# Patient Record
Sex: MALE | Employment: FULL TIME | URBAN - METROPOLITAN AREA
[De-identification: names, ages, dates, MRNs, and addresses within clinical notes are randomized per-mention and may not be internally consistent; named-entity substitution may affect disease eponyms.]

---

## 2024-11-26 ENCOUNTER — OFFICE VISIT (OUTPATIENT)
Dept: UROLOGY | Facility: HOSPITAL | Age: 54
End: 2024-11-26
Payer: COMMERCIAL

## 2024-11-26 DIAGNOSIS — N48.6 PEYRONIE'S DISEASE: Primary | ICD-10-CM

## 2024-11-26 PROCEDURE — 1036F TOBACCO NON-USER: CPT | Performed by: UROLOGY

## 2024-11-26 PROCEDURE — 99213 OFFICE O/P EST LOW 20 MIN: CPT | Performed by: UROLOGY

## 2024-11-26 PROCEDURE — 99203 OFFICE O/P NEW LOW 30 MIN: CPT | Performed by: UROLOGY

## 2024-11-26 RX ORDER — SILDENAFIL 100 MG/1
1 TABLET, FILM COATED ORAL
COMMUNITY
Start: 2024-09-16

## 2024-11-26 RX ORDER — PRAVASTATIN SODIUM 10 MG/1
10 TABLET ORAL
COMMUNITY

## 2024-11-26 ASSESSMENT — PATIENT HEALTH QUESTIONNAIRE - PHQ9
1. LITTLE INTEREST OR PLEASURE IN DOING THINGS: NOT AT ALL
SUM OF ALL RESPONSES TO PHQ9 QUESTIONS 1 AND 2: 0
2. FEELING DOWN, DEPRESSED OR HOPELESS: NOT AT ALL

## 2024-11-26 NOTE — PROGRESS NOTES
NAME:Yogesh Luis  DATE: 11/26/2024               Subjective:   Chief complaint: peyronie's disease     HPI:  54 y.o. male presenting for evaluation of peyronie's disease  at the request of urologist in [facility] HPI:     Erectile Dysfunction:  Duration - 2 Years- pain started a year ago, just started to notice curvature  Able to obtain - yes   Able to maintain - No- sometimes   Pain - Yes  Curvature - Yes Hourglass- reversed   Libido - Good    Prior treatments - Sildenafil-not effective     Relationship status -   Sexual Partners - Female     Voiding Symptoms  Frequency: Q 3 hours   Urgency: No  Urge Incontinence: No  Nocturia: 1 times per night Sleep Disorder: No    Stream: strong  Hesitancy: No  Straining: No  Intermittency: No  Sensation of Incomplete Emptying: No    Stress Incontinence: No  Pads:  No 0per day     Dysuria:  No  Gross Hematuria:  No  UTI:  No  Stones:  No    Consumes 1 gallon of fluid per day.     No medications for his bladder in the past.    Nourodynamic testing in the past.     Bowel Function:   Pt has no  constipation. BM's Q 1 days.     No past medical history on file.  No past surgical history on file.  Social History     Tobacco Use    Smoking status: Never    Smokeless tobacco: Never   Substance Use Topics    Alcohol use: Never     No family history on file.  [unfilled]  No current outpatient medications on file prior to visit.     No current facility-administered medications on file prior to visit.     Yogesh has No Known Allergies.     Review of Systems    14 point ROS reviewed and discussed with the patient. Pertinent positives/negatives discussed in the History of Present Illness (HPI).      FH:  Prostate CA: No  Bladder CA: No  Kidney CA: No  Stones: No    Social History  Occupation: teacher   Tob: No  Etoh: No      Objective:     There is no height or weight on file to calculate BMI.   There were no vitals taken for this visit.     Physical Exam   1. Constitutional: NAD,  Well-developed, Well-nourished  2. Respiratory: Unlabored breathing, no audible wheezes, no use of accessory muscles   3. Cardiovascular: No JVD, extremities perfused, no edema  4. Abdomen: Soft, non-tender, non-distended, no masses or hernia.  No CVA tenderness.    5. Skin: no visible lesions, plaque, rashes, jaundice  6. Neuro: Gait normal, no focal neurologic deficit  7. Psychiatric: Mood and affect normal and appropriate, alert and oriented  8. :    Phallus: dense dorsal plaque   Meatus: Orthotopic and patent.   Testes:  Descended bilaterally, symmetric, without tenderness or mass.   Epididymis is normal bilaterally.  No hydrocele.  No varicocele.   Scrotum: No lesions.   Inguinal canal: No hernia or tenderness.      Assessment/Plan:   Yogesh Luis presents with     1. Peyronie's disease      Peyronie's Disease    The patient presents with Peyronie's disease.  I educated the patient about the disease, and discussed conservative versus surgical management of the disease.  Furthermore, I clarified that the disease usually presents in two phases. The first is an initial active phase characterized by inflammation and possible associated pain, and a second quiescent phase. I explained that for a patient to be a surgical candidate he must have stable and mature disease.  We discussed use of in-office intracavernosal injection (ICI) with or without duplex Doppler ultrasound as further diagnostic testing.    Pt currently able to have intercourse.  Concerned about loss of length    Discussed surgical options    Wants to hold off for now, will fu with any issues or concerns

## 2024-11-26 NOTE — PROGRESS NOTES
"NAME:Yogesh Luis  DATE: 2024               Subjective:   Chief complaint: ED/PD    HPI:  54 y.o. male presenting for evaluation of ED/PD at the request of [facility]     Pt with h/o PD.  Seen at [facility] urology.  Offered xiaflex and traction     HPI:     Erectile Dysfunction:  Duration - {alnumbers:48190::\"0\"} {altime:11141}  Rigidity - {alnumbers:26750::\"0\"} out of 10  Able to obtain - {alyesno:33887::\"No\"}  Able to maintain - {alyesno:93207::\"No\"}  Pain - {alyesno:82880::\"No\"}  Curvature - {alyesno:15684::\"No\"} {aldirection:63484::\"N/A\"}  Libido - {ALgoodbad:45833::\"Good\"}    Prior treatments - {aledoptions:39097}    Relationship status - {ALmarital:81827}  Sexual Partners - {almalefemale:02161}     Voiding Symptoms  Frequency: Q {alnumbers:70598::\"0\"} hours   Urgency: {alyesno:87908::\"No\"}  Urge Incontinence: {alyesno:96316::\"No\"}  Nocturia: {alnumbers:18416::\"0\"} times per night Sleep Disorder: {alyesno:63160::\"No\"}    Stream: {Desc; mild/moderate/stron}  Hesitancy: {alyesno:22276::\"No\"}  Straining: {alyesno:74603::\"No\"}  Intermittency: {alyesno:22650::\"No\"}  Sensation of Incomplete Emptying: {alyesno:05646::\"No\"}    Stress Incontinence: {alyesno:36760::\"No\"}  Pads:  {alyesno:92745::\"No\"} {alnumbers:60560::\"0\"}per day     Dysuria:  {alyesno:45363::\"No\"}  Gross Hematuria:  {alyesno:85660::\"No\"}  UTI:  {alyesno:72846::\"No\"}  Stones:  {alyesno:71131::\"No\"}    Consumes *** oz of fluid per day.     {alyesno:48158::\"No\"}medications for his bladder in the past.  Has used:  ****  ***mg daily  ****  ***mg daily    {alyesno:65132::\"No\"}urodynamic testing in the past.     Bowel Function:   Pt *** constipation. BM's Q {alnumbers:15978::\"0\"} days. Pt *** fecal urgency and *** fecal incontinence.      No past medical history on file.  No past surgical history on file.  Social History     Tobacco Use    Smoking status: Never    Smokeless tobacco: Never   Substance Use Topics    Alcohol use: Never     No family " "history on file.  [unfilled]  Current Outpatient Medications on File Prior to Visit   Medication Sig Dispense Refill    pravastatin (Pravachol) 10 mg tablet Take 1 tablet (10 mg) by mouth once daily.      sildenafil (Viagra) 100 mg tablet Take 1 tablet (100 mg) by mouth once daily.       No current facility-administered medications on file prior to visit.     Yogesh has No Known Allergies.     Review of Systems    14 point ROS reviewed and discussed with the patient. Pertinent positives/negatives discussed in the History of Present Illness (HPI).      FH:  Prostate CA: {alyesno:24933::\"No\"}  Bladder CA: {alyesno:24906::\"No\"}  Kidney CA: {alyesno:24389::\"No\"}  Stones: {alyesno:77119::\"No\"}    Social History  Occupation:  Tob: {alyesno:36734::\"No\"}  Etoh: {alyesno:75678::\"No\"}      Objective:     There is no height or weight on file to calculate BMI.   There were no vitals taken for this visit.     Physical Exam   1. Constitutional: NAD, Well-developed, Well-nourished  2. Respiratory: Unlabored breathing, no audible wheezes, no use of accessory muscles   3. Cardiovascular: No JVD, extremities perfused, no edema  4. Abdomen: Soft, non-tender, non-distended, no masses or hernia.  No CVA tenderness.    5. Skin: no visible lesions, plaque, rashes, jaundice  6. Neuro: Gait normal, no focal neurologic deficit  7. Psychiatric: Mood and affect normal and appropriate, alert and oriented  8. :    Phallus: Normal, no lesions.     Meatus: Orthotopic and patent.   Testes:  Descended bilaterally, symmetric, without tenderness or mass.   Epididymis is normal bilaterally.  No hydrocele.  No varicocele.   Scrotum: No lesions.   Inguinal canal: No hernia or tenderness.     MANDI: *** grams, benign, no lesions, masses.    Labs  No results found for: \"TESTOSTERONE\"  No results found for: \"LH\"  No results found for: \"FSH\"  No results found for: \"ESTRADIOL\"  No results found for: \"PSA\"  No components found for: \"CBC\"  No results found " "for: \"PROLACTIN\"  No results found for: \"GFRMALE\"  No results found for: \"CREATININE\"  No results found for: \"CHOL\"  No results found for: \"HDL\"  No results found for: \"CHHDL\"  No results found for: \"LDLF\"  No results found for: \"VLDL\"  No results found for: \"TRIG\"  No results found for: \"HGBA1C\"  No components found for: \"TESTOTMS\"  No results found for: \"TESTF\"  No results found for: \"17HYDROXYPRO\"  No results found for: \"HCT\"    Imaging    Procedures:     PVR:    Assessment/Plan:   Yogesh Toby presents with       No diagnosis found.    No orders of the defined types were placed in this encounter.       No follow-ups on file.      "